# Patient Record
Sex: FEMALE | Race: WHITE | Employment: FULL TIME | URBAN - METROPOLITAN AREA
[De-identification: names, ages, dates, MRNs, and addresses within clinical notes are randomized per-mention and may not be internally consistent; named-entity substitution may affect disease eponyms.]

---

## 2020-03-22 NOTE — LETTER
21      Patient: Heidy Garcia  : 1990 Visit date: 3/1/2021    Dear Geovani Haque,      I examined your patient in consultation today. She is 5 days post dog bite puncture wounds of the base of the right index finger.     Ther
30

## 2021-01-27 ENCOUNTER — HOSPITAL ENCOUNTER (OUTPATIENT)
Age: 31
Discharge: HOME | End: 2021-01-27
Attending: EMERGENCY MEDICINE
Payer: COMMERCIAL

## 2021-01-27 VITALS
RESPIRATION RATE: 18 BRPM | DIASTOLIC BLOOD PRESSURE: 64 MMHG | HEART RATE: 85 BPM | OXYGEN SATURATION: 100 % | SYSTOLIC BLOOD PRESSURE: 144 MMHG | TEMPERATURE: 99 F

## 2021-01-27 DIAGNOSIS — S61.210A LACERATION OF RIGHT INDEX FINGER WITHOUT FOREIGN BODY WITHOUT DAMAGE TO NAIL, INITIAL ENCOUNTER: Primary | ICD-10-CM

## 2021-01-27 PROCEDURE — 90471 IMMUNIZATION ADMIN: CPT

## 2021-01-27 PROCEDURE — 99203 OFFICE O/P NEW LOW 30 MIN: CPT

## 2021-01-27 PROCEDURE — 99202 OFFICE O/P NEW SF 15 MIN: CPT

## 2021-01-27 NOTE — ED INITIAL ASSESSMENT (HPI)
Last night around 9pm patient cut her right second finger with a knife with cutting a bagel. rom to right second finger intact. Unsure of last tdap.

## 2021-01-27 NOTE — ED PROVIDER NOTES
Patient Seen in: [facility]      History   Patient presents with:  Laceration    Stated Complaint: Right hand index finger injury, cut    HPI/Subjective:   HPI    The patient is a 65-year-old female with no significant past medical history pr finger. ED Course   Labs Reviewed - No data to display       The concern regarding delayed presentation as well as well approximated wound with no ongoing bleeding was discussed with the patient.   She is agreeable with allowing the wound to heal witho

## 2021-02-24 ENCOUNTER — HOSPITAL ENCOUNTER (OUTPATIENT)
Age: 31
Discharge: HOME | End: 2021-02-24
Payer: COMMERCIAL

## 2021-02-24 VITALS
TEMPERATURE: 98 F | RESPIRATION RATE: 18 BRPM | OXYGEN SATURATION: 99 % | HEART RATE: 73 BPM | DIASTOLIC BLOOD PRESSURE: 87 MMHG | SYSTOLIC BLOOD PRESSURE: 131 MMHG

## 2021-02-24 DIAGNOSIS — S61.451A DOG BITE, HAND, RIGHT, INITIAL ENCOUNTER: Primary | ICD-10-CM
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
## 2021-02-24 PROCEDURE — 99213 OFFICE O/P EST LOW 20 MIN: CPT

## 2021-02-24 RX ORDER — AMOXICILLIN AND CLAVULANATE POTASSIUM 875; 125 MG/1; MG/1
1 TABLET, FILM COATED ORAL 2 TIMES DAILY
Qty: 14 TABLET | Refills: 0 | Status: SHIPPED | OUTPATIENT
Start: 2021-02-24 | End: 2021-03-03

## 2021-02-25 NOTE — ED INITIAL ASSESSMENT (HPI)
PATIENT ARRIVED AMBULATORY TO ROOM C/O A DOG BITE TO THE RIGHT HAND. THREE PUNCTURE WOUNDS TO THE RIGHT HAND. PATIENT STATES [place] THE [place] AND WAS BIT BY A DOG WHILE AT THE PARK. TETANUS IS UTD.

## 2021-02-25 NOTE — ED PROVIDER NOTES
Patient Seen in: [facility]      History   Patient presents with:  Bite    Stated Complaint: dog bite right hand    HPI/Subjective:   Well-appearing 79-year-old female presents with complaints of multiple puncture wounds to her right hand vera moist.     Neck: Supple. Normal ROM. Lungs: Good inspiratory effort. No accessory muscle use or tachypnea. Abdomen: Non-distended. Back: No tenderness. Extremities: Capillary refill noted.  The patient's motor strength is 5/5 and symmetric in

## 2021-03-01 ENCOUNTER — OFFICE VISIT (OUTPATIENT)
Dept: SURGERY | Facility: CLINIC | Age: 31
End: 2021-03-01
Payer: COMMERCIAL

## 2021-03-01 DIAGNOSIS — S61.411A LACERATION WITHOUT FOREIGN BODY OF RIGHT HAND, INITIAL ENCOUNTER: Primary | ICD-10-CM

## 2021-03-01 PROCEDURE — 99203 OFFICE O/P NEW LOW 30 MIN: CPT | Performed by: PLASTIC SURGERY

## 2021-03-01 NOTE — H&P
Injury 1: Dog Bite R hand  - Date: 02/24/21  - Days Since: 5  Jolene Bansal is a 27year old female that presents with Patient presents with: Injury: Dog bite R hand  .     REFERRED BY:  Jennifer Rios    Pacemaker: No  Latex Allergy: no  Coum daily for 7 days. 14 tablet 0   • bacitracin 500 UNIT/GM External Ointment Apply 1 Application topically 2 (two) times daily for 10 days.  15 g 0   • Clobetasol Propionate 0.05 % External Ointment Apply to the AA on b/l hands and b/l feet BID x 2 weeks PRN,